# Patient Record
Sex: FEMALE | HISPANIC OR LATINO | Employment: UNEMPLOYED | URBAN - METROPOLITAN AREA
[De-identification: names, ages, dates, MRNs, and addresses within clinical notes are randomized per-mention and may not be internally consistent; named-entity substitution may affect disease eponyms.]

---

## 2022-04-15 ENCOUNTER — HOSPITAL ENCOUNTER (EMERGENCY)
Facility: HOSPITAL | Age: 22
Discharge: HOME | End: 2022-04-15
Attending: EMERGENCY MEDICINE | Admitting: EMERGENCY MEDICINE
Payer: MEDICAID

## 2022-04-15 VITALS
SYSTOLIC BLOOD PRESSURE: 132 MMHG | DIASTOLIC BLOOD PRESSURE: 78 MMHG | RESPIRATION RATE: 16 BRPM | OXYGEN SATURATION: 99 % | HEART RATE: 97 BPM | WEIGHT: 170 LBS | TEMPERATURE: 98.6 F

## 2022-04-15 DIAGNOSIS — L08.9 TOE INFECTION: ICD-10-CM

## 2022-04-15 DIAGNOSIS — L60.0 INGROWN TOENAIL OF RIGHT FOOT: ICD-10-CM

## 2022-04-15 PROCEDURE — 11765 WEDGE EXCISION SKN NAIL FOLD: CPT

## 2022-04-15 PROCEDURE — 99283 EMERGENCY DEPT VISIT LOW MDM: CPT | Mod: 25

## 2022-04-15 RX ORDER — CLINDAMYCIN HCL 150 MG
450 CAPSULE ORAL 3 TIMES DAILY
Qty: 63 CAPSULE | Refills: 0 | Status: SHIPPED | OUTPATIENT
Start: 2022-04-15 | End: 2022-04-22

## 2022-04-15 NOTE — DISCHARGE INSTRUCTIONS
You were seen in the Emergency Department today for evaluation of a toe infection.  The toe was numbed and some pus came out.  You need to follow-up with podiatry for definitive management.  You were prescribed clindamycin for the infection. You should take all medications as prescribed.  Return if you have new or worsening symptoms.

## 2022-04-15 NOTE — ED NOTES
eMERGENCY dEPARTMENT Physician Triage Note    BRIEF HISTORYAND MEDICAL DECISION MAKING     Patient has history of ingrown toenails.  Has had pain in great toe on R foot like an ingorwn toenail for a week or so.  Pain worsening since Monday when she attempted to cut out the toenail.  Is now very painful.  No fevers.     Will triage to waiting room.  Will need further evaluation and potentially intervention.    BRIEF EXAM  PHYSICAL EXAM    VITAL SIGNS: /78   Pulse 97   Temp 98.6  F (37  C) (Temporal)   Resp 16   Wt 77.1 kg (170 lb)   SpO2 99%   Constitutional:  Well developed, well nourished,    EYES: Conjunctivae clear, no discharge  HENT: Atraumatic, normocephalic, bilateral external ears normal.  Oropharynx moist. Nose normal.   Neck: Normal ROM , Supple   Respiratory:  No respiratory distress, normal nonlabored respirations.   Cardiovascular:  Distal perfusion appears intact  Musculoskeletal:  No edema appreciated, No cyanosis, No clubbing. Good range of motion in all major joints. R great toe with open lesion to medial portion of toenail.  Mild erythema.  Diff to tell if fluctuance is present.  Integument:  Warm, Dry, No erythema, No rash.   Neurologic:  Alert and oriented. No focal deficits noted.  Ambulatory  Psychiatric:  Affect normal        LAB:  All pertinent labs reviewed and interpreted.  Labs Ordered and Resulted from Time of ED Arrival to Time of ED Departure - No data to display  RADIOLOGY:  Reviewed all pertinent imaging. Please see official radiology report.  No orders to display        Severiano Cummings MD  04/15/22 6278

## 2022-04-15 NOTE — ED PROVIDER NOTES
EMERGENCY DEPARTMENT ENCOUNTER      NAME: Jennifer Elias  AGE: 21 year old female  YOB: 2000  MRN: [id number]  EVALUATION DATE & TIME: 4/15/2022  5:51 PM    PCP: System, Provider Not In    ED PROVIDER: Brandy Hernandez M.D.      Chief Complaint   Patient presents with     Toe Pain     FINAL IMPRESSION:  1. Toe infection    2. Ingrown toenail of right foot      ED COURSE & MEDICAL DECISION MAKING:    Pertinent Labs & Imaging studies reviewed. (See chart for details)  ED Course as of 04/15/22 2150   Fri Apr 15, 2022   1806 Patient is a 21-year-old female who comes in today for evaluation of right toe pain.  She thinks she may have an infected ingrown toenail.  She is complaining of pain over the area.  There is some purulent drainage.  It is quite tender.  I just placed a digital block and we will go check on her in a few minutes.  I will explore it a little bit and get the pus out.  She will likely need to follow-up with podiatry as an outpatient to have a procedure to pull the edges of that nail away.  She is comfortable with the plan.   1824 After I numbed it up I put a scalpel underneath the cuticle to see if there was any further drainage.  There was a small amount of pus but not a lot.  I will put her on clindamycin and she can follow-up with podiatry as an outpatient for definitive management.  She is comfortable with the plan.        5:58 PM I met with the patient, obtained history, performed an initial exam, and discussed options and plan for diagnostics and treatment here in the ED. PPE worn: N95 mask, surgical cap, gloves    At the conclusion of the encounter I discussed  the results of all of the tests and the disposition with patient.   All questions were answered.  The patient acknowledged understanding and was involved in the decision making regarding the overall care plan.      I discussed with patient the utility, limitations and findings of the exam/interventions/studies done during  "this visit as well as the list of differential diagnosis and symptoms to monitor/return to ER for.  Additional verbal discharge instructions were provided.     MEDICATIONS GIVEN IN THE EMERGENCY:  Medications - No data to display    NEW PRESCRIPTIONS STARTED AT TODAY'S ER VISIT  Discharge Medication List as of 4/15/2022  6:51 PM      START taking these medications    Details   clindamycin (CLEOCIN) 150 MG capsule Take 3 capsules (450 mg) by mouth 3 times daily for 7 days, Disp-63 capsule, R-0, E-Prescribe                =================================================================    HPI    Triage Note: Pt arrives with right big toe pain. Open area with bleeding. Gets frequent ingrown toenails.      Patient information was obtained from: Patient    Use of : N/A         Jennifer Elias is a 21 year old female who presents right great toe pain.     The patient reports having an \"infected toe nail.\" She was able to go to school today, but adds that the pain worsened throughout the day, prompting her to be present in the ED. The patient did make an appointment with her primary care provider on Tuesday (4 days from now), but states that she \"can't wait.\" She is otherwise healthy and denies any medical issues. No other symptoms or complaints at this time.     Positive for right great toe pain.     REVIEW OF SYSTEMS   Except as stated in the HPI all other systems reviewed and are negative.    PAST MEDICAL HISTORY:  History reviewed. No pertinent past medical history.    PAST SURGICAL HISTORY:  History reviewed. No pertinent surgical history.    CURRENT MEDICATIONS:    No current facility-administered medications for this encounter.    Current Outpatient Medications:      clindamycin (CLEOCIN) 150 MG capsule, Take 3 capsules (450 mg) by mouth 3 times daily for 7 days, Disp: 63 capsule, Rfl: 0    ALLERGIES:  No Known Allergies    FAMILY HISTORY:  History reviewed. No pertinent family history.    SOCIAL " HISTORY:   Social History     Socioeconomic History     Marital status: Single       PHYSICAL EXAM    VITAL SIGNS: /78   Pulse 97   Temp 98.6  F (37  C) (Temporal)   Resp 16   Wt 77.1 kg (170 lb)   SpO2 99%    GENERAL: Awake, Alert, answering questions, No acute distress, Well nourished  HEENT: Normal cephalic, Atraumatic, bilateral external ears normal, No scleral icterus, mask in place  NECK: No obvious swelling or abnormality, No stridor  PULMONARY:Normal and symmetric breath sounds, No respiratory distress, Lungs clear to auscultation bilaterally. No wheezing  CARDIOVASCULAR: Regular rate and rhythm, Distal pulses present and normal.  EXTREMITIES: Moves all extremities spontaneously, warm, no edema, No major deformities, patient has tenderness to the medial big toe just medial to the nail.  There was a little bit of purulent drainage.  Tissue appears irritated.  NEURO: No facial droop, normal motor function, Normal speech   PSYCH: Normal mood and affect  SKIN: No rashes on visualized skin, dry, warm     PROCEDURES:   PROCEDURE: Digital Block   INDICATIONS:  Distal toe infection   PROCEDURE PROVIDER: Dr Brandy Hernandez   SITE: Right great toe (1st toe)   MEDICATION: 5 mL of 0.25% Bupivacaine with epinephrine   NOTE: The skin overlying the site for injection was prepped with chlorhexidine.  Needle was inserted in a standard three point injection pattern.  Each time the area was aspirated and there was no return of blood.  I then injected the medication at the base of the digit.  The patient had good response to the procedure   COMPLICATIONS: Patient tolerated procedure well, without complication         I, Raul Taylor, am serving as a scribe to document services personally performed by Dr. Hernandez based on my observation and the provider's statements to me. I, Brandy Hernandez MD attest that Raul Taylor is acting in a scribe capacity, has observed my performance of the services and has documented them in  accordance with my direction.    Brandy Hernandez M.D.  Emergency Medicine  [facility] EMERGENCY DEPARTMENT  [facility] [facility] [id number] [phone]  Dept: [phone]     Brandy Hernandez MD  04/15/22 4857

## 2022-10-25 ENCOUNTER — HOSPITAL ENCOUNTER (EMERGENCY)
Facility: HOSPITAL | Age: 22
Discharge: HOME | End: 2022-10-25
Payer: MEDICAID